# Patient Record
Sex: MALE | Race: ASIAN | NOT HISPANIC OR LATINO | URBAN - METROPOLITAN AREA
[De-identification: names, ages, dates, MRNs, and addresses within clinical notes are randomized per-mention and may not be internally consistent; named-entity substitution may affect disease eponyms.]

---
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
## 2021-04-16 ENCOUNTER — TRANSCRIPTION ENCOUNTER (OUTPATIENT)
Age: 21
End: 2021-04-16

## 2022-03-17 DIAGNOSIS — Z01.812 ENCOUNTER FOR PREPROCEDURAL LABORATORY EXAMINATION: ICD-10-CM

## 2022-04-06 ENCOUNTER — APPOINTMENT (OUTPATIENT)
Dept: PULMONOLOGY | Facility: CLINIC | Age: 22
End: 2022-04-06
Payer: COMMERCIAL

## 2022-04-06 VITALS
HEIGHT: 68 IN | TEMPERATURE: 97.7 F | SYSTOLIC BLOOD PRESSURE: 104 MMHG | WEIGHT: 145 LBS | DIASTOLIC BLOOD PRESSURE: 58 MMHG | HEART RATE: 68 BPM | BODY MASS INDEX: 21.98 KG/M2 | RESPIRATION RATE: 16 BRPM | OXYGEN SATURATION: 98 %

## 2022-04-06 DIAGNOSIS — R06.02 SHORTNESS OF BREATH: ICD-10-CM

## 2022-04-06 DIAGNOSIS — Z82.69 FAMILY HISTORY OF OTHER DISEASES OF THE MUSCULOSKELETAL SYSTEM AND CONNECTIVE TISSUE: ICD-10-CM

## 2022-04-06 DIAGNOSIS — J44.9 CHRONIC OBSTRUCTIVE PULMONARY DISEASE, UNSPECIFIED: ICD-10-CM

## 2022-04-06 DIAGNOSIS — Z87.898 PERSONAL HISTORY OF OTHER SPECIFIED CONDITIONS: ICD-10-CM

## 2022-04-06 DIAGNOSIS — Z71.89 OTHER SPECIFIED COUNSELING: ICD-10-CM

## 2022-04-06 DIAGNOSIS — J30.9 ALLERGIC RHINITIS, UNSPECIFIED: ICD-10-CM

## 2022-04-06 DIAGNOSIS — K21.9 GASTRO-ESOPHAGEAL REFLUX DISEASE W/OUT ESOPHAGITIS: ICD-10-CM

## 2022-04-06 PROCEDURE — 94727 GAS DIL/WSHOT DETER LNG VOL: CPT

## 2022-04-06 PROCEDURE — 94618 PULMONARY STRESS TESTING: CPT

## 2022-04-06 PROCEDURE — 94729 DIFFUSING CAPACITY: CPT

## 2022-04-06 PROCEDURE — 94010 BREATHING CAPACITY TEST: CPT

## 2022-04-06 PROCEDURE — 71046 X-RAY EXAM CHEST 2 VIEWS: CPT

## 2022-04-06 PROCEDURE — 95012 NITRIC OXIDE EXP GAS DETER: CPT

## 2022-04-06 PROCEDURE — 99204 OFFICE O/P NEW MOD 45 MIN: CPT | Mod: 25

## 2022-04-06 RX ORDER — FLUTICASONE FUROATE, UMECLIDINIUM BROMIDE AND VILANTEROL TRIFENATATE 200; 62.5; 25 UG/1; UG/1; UG/1
200-62.5-25 POWDER RESPIRATORY (INHALATION)
Qty: 3 | Refills: 1 | Status: ACTIVE | COMMUNITY
Start: 2022-04-06 | End: 1900-01-01

## 2022-04-06 NOTE — ADDENDUM
[FreeTextEntry1] : Documented by Kingston Layne acting as a scribe for Dr. Carlton Marks on 04/06/2022\par \par All medical record entries made by the scribe were at my, Dr. Carlton Marks's, direction and personally dictated by me on 04/06/2022. I have reviewed the chart and agree that the record accurately reflects my personal performance of the history, physical exam, assessment, and plan. I have also personally directed, reviewed, and agree with the discharge instructions.

## 2022-04-06 NOTE — PHYSICAL EXAM

## 2022-04-06 NOTE — HISTORY OF PRESENT ILLNESS
[TextBox_4] : Mr. AGUIRRE is a 21 year male with a history of Thalassemia trait, strep throat, who now comes in for an initial pulmonary evaluation. His chief complaint is\par -he denies getting COVID-19\par -he notes getting SOB intermittently for more than 6 months\par -he notes seeing a cardiologist regarding the SOB\par -he notes the weather, cold air, and change in climate does not affect the SOB\par -he notes the SOB can be on exertion and on rest\par -he notes controlling his breathing makes the SOB better\par -he denies a PND\par -he notes having reflux and saw his PCP Dr. Oumou Galloway about it\par -he notes feeling a sensation of a lump in his throat he needs to clear\par -he notes eating well\par -he notes his weight is good\par -he notes he finds it hard to fall asleep sometimes\par -he denies snoring\par -he denies muscle cramps or spasms but notes he moves at night a lot\par -he notes his bowels are regular\par -he denies and rashes\par -he notes his vision (besides glasses) and hearing are normal\par -he denies any swollen glands or ankles\par \par -patient denies any headaches, nausea, vomiting, fever, chills, sweats, chest pain, chest pressure, palpitations, coughing, wheezing, fatigue, diarrhea, constipation, dysphagia, myalgias, dizziness, leg swelling, leg pain, itchy eyes, itchy ears, heartburn, or sour taste in the mouth

## 2022-04-06 NOTE — PROCEDURE
[FreeTextEntry1] : Full PFT revealed moderate-severe obstructive dysfunction, with a FEV1 of 2.48L, which is 58% of predicted, mildly reduced lung volumes, and a diffusion of 25.5, which is 86% of predicted, with a normal flow volume loop \par \par FENO was 10; a normal value being less than 25. Fractional exhaled nitric oxide (FENO) is regarded as a simple, noninvasive method for assessing eosinophilic airway inflammation. Produced by a variety of cells within the lung, nitric oxide (NO) concentrations are generally low in healthy individuals. However, high concentrations of NO appear to be involved in nonspecific host defense mechanisms and chronic inflammatory  diseases such as asthma. The American Thoracic Society (ATS) therefore recommended using FENO to aid in the diagnosis and monitoring of eosinophilic airway inflammation and asthma, and for identifying steroid responsive individuals whose chronic respiratory symptoms may be caused by airway inflammation \par \par 6 minute walk test reveals a low saturation of 95% with no evidence of dyspnea or fatigue; walked 505.3 meters \par \par CXR revealed a normal sized heart; there was no evidence of infiltrate or effusion -- A normal appearing chest radiograph

## 2022-04-06 NOTE — ASSESSMENT
[FreeTextEntry1] : Mr. AGUIRRE is a 21 year male with a history of Thalassemia trait, strep throat, who now comes in for an initial pulmonary evaluation for SOB, likely chronic obstructive asthma, ?allergies, known GERD\par \par \par The patient's SOB is felt to be multifactorial:\par -poor mechanics of breathing\par -out of shape/overweight\par -Pulmonary\par    -chronic obstructive asthma\par -Cardiac\par \par Problem 1: Chronic Obstructive Asthma\par -Add Trelegy 200 1 inhalation qD \par -Add Ventolin 2 puffs Q6H, pre-exercise \par -Complete Alpha 1 Anti-Trypsin level\par -Complete CF panel\par -Inhaler technique reviewed as well as oral hygiene technique reviewed with patient. Avoidance of cold air, extremes of temperature, rescue inhaler should be used before exercise. Order of medication reviewed with patient. Recommended use of a cool mist humidifier in the bedroom.\par -Asthma is believed to be caused by inherited (genetic) and environmental factor, but its exact cause is unknown. asthma may be triggered by allergens, lung infections, or irritants in the air. Asthma triggers are different for each person \par \par Problem 2: r/o Allergies\par -Get blood work to include: asthma panel, food IgE panel, IgE level, eosinophil level, vitamin D level\par -Environmental measures for allergies were encouraged including mattress and pillow cover, air purifier, and environmental controls. \par \par Problem 3: GERD\par -Add Omeprazole 40 mg QAM, pre-meal \par - Rule of 2s: avoid eating too much, eating too late, eating too spicy, eating two hours before bed.\par - Things to avoid including overeating, spicy foods, tight clothing, eating within two hours of bed, this list is not all inclusive.\par - For treatments of reflux, possible options discussed including diet control, H2 blockers, PPIs, as well as coating motility agents discussed as treatment options. Timing of meals and proximity of last meal to sleep were discussed. If symptoms persist, a formal gastrointestinal evaluation is needed. \par \par Problem 4: COVID-19 Education\par -s/p COVID-19 vaccine x2\par -Complete COVID-19 antibody test\par \par Problem 5: Cardiac\par -Recommend cardiac follow up evaluation with cardiologist if needed (Dr. Goldberg)\par \par Problem 6: overweight/out of shape\par -Recommend "Muniq" OTC for weight loss, energy, and blood sugar\par - Weight loss, exercise and diet control were discussed and are highly encouraged. Treatment options were given such as aqua therapy, and contacting a nutritionist. Recommended to use the elliptical, stationary bike, less use of treadmill. Mindful eating was explained to the patient. Obesity is associated with worsening asthma, SOB, and potential for cardiac disease, diabetes, and other underlying medical conditions.\par \par Problem 7: Poor mechanics of breathing\par -Recommended Wim Hof and Buteyko breathing techniques\par - Proper breathing techniques were reviewed with an emphasis on exhalation. Patient instructed to breath in for 1 second and out for four seconds. Patient was encouraged not to talk while walking.\par \par Problem 8: Health Maintenance\par -s/p flu shot\par -recommended strep pneumonia vaccines: Prevnar-13 vaccine, follow by Pneumo vaccine 23 one year following\par -recommended early intervention for URIs\par -recommended regular osteoporosis evaluations\par -recommended early dermatological evaluations\par -recommended after the age of 50 to the age of 70, colonoscopy every 5 years\par \par f/u in 6-8 weeks\par pt is encouraged to call or fax the office with any questions or concerns.

## 2022-04-07 RX ORDER — ALBUTEROL SULFATE 90 UG/1
108 (90 BASE) INHALANT RESPIRATORY (INHALATION)
Qty: 1 | Refills: 2 | Status: ACTIVE | COMMUNITY
Start: 2022-04-06 | End: 1900-01-01

## 2022-08-04 NOTE — ED PEDIATRIC NURSE NOTE - NS ED NURSE DISCH DISPOSITION
Pharmacy Name:      [facility]    Pharmacy representative name:     FARHANA - PHARMACIST    Pharmacy representative phone number:     [phone]    What medication are you calling in regards to:     Semaglutide,0.25 or 0.5MG/DOS, (Ozempic, 0.25 or 0.5 MG/DOSE,) 2 MG/1.5ML solution pen-injector    What question does the pharmacy have:     CALLER STATED MEDICATION LISTED ABOVE IS NOT AVAILABLE    CALLER REQUESTED APPROVAL FROM DR BEAL TO SWITCH THE MEDICATION WITH ANOTHER [facility]    PHARMACY STATED THE OZEMPIC DOSAGE OF 0.5 MG WOULD TRANSLATE TO TRULICITY DOSAGE AS, 1.5 MG    PHARMACY STATED A VERBAL TO CONFIRM SWITCH IS ACCEPTABLE    Who is the provider that prescribed the medication:     DR BEAL       Discharged

## 2024-08-13 NOTE — ED PEDIATRIC TRIAGE NOTE - TEMPERATURE IN FAHRENHEIT (DEGREES F)
Pt stated that [facility] sent over Munson Healthcare Grayling 97.3

## 2024-10-31 ENCOUNTER — APPOINTMENT (OUTPATIENT)
Dept: OTOLARYNGOLOGY | Facility: CLINIC | Age: 24
End: 2024-10-31
Payer: COMMERCIAL

## 2024-10-31 ENCOUNTER — NON-APPOINTMENT (OUTPATIENT)
Age: 24
End: 2024-10-31

## 2024-10-31 VITALS
SYSTOLIC BLOOD PRESSURE: 117 MMHG | OXYGEN SATURATION: 99 % | DIASTOLIC BLOOD PRESSURE: 74 MMHG | BODY MASS INDEX: 20.76 KG/M2 | HEIGHT: 70 IN | WEIGHT: 145 LBS | HEART RATE: 85 BPM

## 2024-10-31 DIAGNOSIS — R09.89 OTHER SPECIFIED SYMPTOMS AND SIGNS INVOLVING THE CIRCULATORY AND RESPIRATORY SYSTEMS: ICD-10-CM

## 2024-10-31 DIAGNOSIS — J30.9 ALLERGIC RHINITIS, UNSPECIFIED: ICD-10-CM

## 2024-10-31 PROCEDURE — 99204 OFFICE O/P NEW MOD 45 MIN: CPT | Mod: 25

## 2024-10-31 PROCEDURE — 31231 NASAL ENDOSCOPY DX: CPT

## 2024-10-31 RX ORDER — PNV NO.95/FERROUS FUM/FOLIC AC 28MG-0.8MG
TABLET ORAL
Refills: 0 | Status: ACTIVE | COMMUNITY

## 2024-10-31 RX ORDER — FLUTICASONE PROPIONATE 50 UG/1
50 SPRAY, METERED NASAL
Qty: 1 | Refills: 6 | Status: ACTIVE | COMMUNITY
Start: 2024-10-31 | End: 1900-01-01

## 2025-02-26 ENCOUNTER — APPOINTMENT (OUTPATIENT)
Dept: OTOLARYNGOLOGY | Facility: CLINIC | Age: 25
End: 2025-02-26